# Patient Record
(demographics unavailable — no encounter records)

---

## 2018-12-12 NOTE — MRI
MRI RIGHT HIP WITHOUT CONTRAST: 

12/12/18

 

HISTORY: 

Trochanteric bursitis. Fall. M70.61.

 

COMPARISON:  

None.

 

FINDINGS:  

 

BONES:

There is no acute fracture. No malalignment. No stress edema. Moderate hypertrophic degenerative ham
ge of the pubic symphysis. 

 

TENDONS:

There is high grade partial tear of both the right gluteus medius and minimus tendon at the greater t
rochanteric insertion. There are erosive changes of the lateral cortex of the right greater trochante
r. There is a large volume right sided greater trochanteric bursitis. 

 

INTRAPELVIC SOFT TISSUES:

Unremarkable. 

 

IMPRESSION:  

High grade gluteus minimus and partial grade gluteus medius right sided tendon tears with greater tro
chanteric bursitis. Patient may benefit ultrasound guided steroid injection. There is associated mild
 40-50% atrophy of the right gluteus minimus muscle. 

 

POS: [facility]

## 2019-02-28 NOTE — CT
CT PULMONARY LUNG SCAN:

 

History: 40-plus year smoker. History of stomach cancer. 

 

FINDINGS: 

The lungs are clear of any infiltrative process. No bronchiectatic change or honeycombing noted. With
in the right upper lobe is a slightly more linear oriented opacity seen on sagittal image 62, axial i
mage 18 measuring 4 x 6 mm in size. Also within the right upper lobe in the more anterior segment see
n on axial 26 and coronal 43 is some nodular opacities, almost more of a tree and bud appearance. The
re is a pleural based nodular density which is located in the posterior segment of the right upper lo
be. It abuts the major fissure and may represent an intrafissural node. It is seen axial image 26, sa
gittal image 51. It measures in the 5-6 mm range. Another nodular density is seen on axial image 22, 
it measures 4 mm in size. It is seen on sagittal image 62 and coronal image 77 and it measures larges
t on the sagittal images were it is in the 5-6 mm range. 

 

No significant mediastinal adenopathy appreciated. The visualized liver parenchyma shows no focal fin
dings. Gallbladder has been removed. 

 

IMPRESSION: 

Lung RADS category 2 - benign appearance, low likelihood of becoming clinically active cancer. Annual
 follow up CT is recommended. 

 

POS: [facility]

## 2019-07-01 NOTE — RAD
TWO VIEW CHEST SERIES:

 

INDICATIONS:

Abnormal weight loss.

 

COMPARISON:

Exam from 04/13/2018.

 

FINDINGS:

There is hyperinflation of the lungs.  Mild interstitial prominence and slight reticulonodular nodula
rity is seen at the upper to mid lung zones, more so on the right.  The cardiomediastinal silhouette 
is of normal size.  There is vascular calcification.

 

IMPRESSION:

Mild reticulonodularity of the upper to mid lung zones, more so on the right.  This could relate to a
n atypical infection.  Given nodularity, recommend follow-up two view chest upon completion of treatm
ent regimen and resolution of acute symptoms, to confirm resolution.

 

POS: [facility]

## 2019-08-19 NOTE — RAD
EXAM:

Chest PA and lateral:



HISTORY:

Cough. Follow-up chest radiograph 



COMPARISON:

7/1/2019



FINDINGS:



Heart: Normal cardiac silhouette

Aorta: Stable elongation of the thoracic aorta

Pulmonary vessels: Normal

Costophrenic angles: Costophrenic angles are clear. 

Lungs: Hyperinflation. Chronic changes. No mass. No consolidation.

Pneumothorax: No pneumothorax

Osseous structures: No osseous abnormalities

IMPRESSION:



1. No acute cardiopulmonary process.

2. Stable elongation of the aorta

3. Hyperinflation. Chronic changes







Reported By: Chaparro Yanez 

Electronically Signed:  8/19/2019 3:22 PM

## 2019-09-30 NOTE — OP
DATE OF PROCEDURE:  09/27/2019



PREOPERATIVE DIAGNOSIS:  Celiac artery stenosis with weight loss.



PROCEDURE PERFORMED:  Abdominal aortography with selective cannulation of the celiac

artery and superior mesenteric artery separately.  This was done through a right

femoral approach and then a right brachial artery cutdown and repair with stenting

of the celiac artery. 



ANESTHESIA:  1% lidocaine.



FLUOROSCOPY:  45.2 minutes.



CONTRAST:  60 mL.



DESCRIPTION OF PROCEDURE:  After prepping and draping the right groin,

ultrasound-guided puncture of the right common femoral artery was carried out after

lidocaine administration.  A 5-St Lucian dilator and sheath were placed following which

the PA and lateral angiography was obtained and the celiac artery and superior

mesenteric artery were each cannulated.  There was no stenosis in the superior

mesenteric artery; however, with the 5-St Lucian [place] catheter in the superior

mesenteric artery, there was no reflux of contrast.  At this time, several wires

were used to enter the celiac artery including angled Glidewire, Bentson guidewire,

and attempts with stiffer wires as well as 0.014 Luge wire, but no catheter would

track over this.  At that time, it was felt that the angle off the aorta was too

steep in the celiac artery and the approach would need to be carried out from above.

 Following this, the right arm was then prepped and draped and lidocaine used to

infiltrate above the antecubital crease.  Cutdown of the brachial artery was then

performed and a needle puncture and placement of a 90 cm 5-St Lucian destination sheath

was accomplished.  Using a Bentson wire as well as a Glidewire and various catheters

to negotiate the aortic arch, the 6-St Lucian Destination sheath was eventually passed

down to the level of the celiac artery.  Following this, a 0.014 wire was advanced

and balloon angioplasty with a 4 x 2 balloon was performed.  The wire then came out

of the vessel due to built-up tension in the sheath and it moved.  The wire was then

replaced.  Following which, a 5 x 15 Express stent was deployed with 

good angiographic result.  Following this, the brachial artery was repaired with a

running 6-0 Prolene suture.  The wound was then closed in layers.  The patient's

catheter in the groin was then removed and pressure held, and she was taken to the

recovery room area. 







Job ID:  [id number]

## 2019-10-25 NOTE — MRI
MRI OF THE LUMBAR SPINE WITH AND WITHOUT IV CONTRAST:

10/25/19

 

INDICATION:

75-year-old female with lumbar radiculopathy; right sided low back pain that radiates into the right 
leg for several months. History of lumbar surgery two years ago without recent trauma. 

 

CONTRAST:

8 mL Multihance.

 

COMPARISON: 

MR lumbar spine with and without contrast dated 2/27/18.

 

FINDINGS: 

Since the comparison examination there has been interval placement of right unilateral pedicle screws
 at L4 and L5. 

 

Bone marrow signal intensity appears within normal limits. Mild Modic end plate degenerative change s
een at L5. No acute fracture is demonstrated. 

 

Conus is seen to terminate at approximately L1. 

 

The visualized aspects of the retroperitoneum demonstrates small bilateral renal cysts. 

 

At L5-S1, there is a broad based bulge with a superimposed central protrusion that is stable to the p
rior exam. The disc bulge in addition to facet hypertrophy and loss of disc space height induces mode
rate left and mild right neural foraminal narrowing. This is stable to the prior exam. 

 

At L4-5, there is a disc osteophyte complex with facet hypertrophy but no appreciable central canal n
arrowing. There is suggestion of mild right neural foraminal narrowing which is stable. 

 

At L3-4, there is a broad based disc bulge with facet hypertrophy inducting mild central canal narrow
ing with mild bilateral neural foraminal narrowing which is stable to the prior exam. 

 

At L2-3, there is a disc osteophyte complex and facet joint degenerative change inducing mild left ne
ural foraminal narrowing which is stable to the prior exam. 

 

At L1-2, there is no appreciable central canal or neural foraminal narrowing. 

 

At T12-L1, there is no appreciable central canal or neural foraminal narrowing. 

 

Postcontrast images demonstrate no definite abnormal region of enhancement. 

 

IMPRESSION: 

1.      Interval postsurgical change of a right unilateral interbody fusion at L4-5. 

2.      Stable multilevel spondylosis of the lumbar spine with multilevel neural foraminal narrowing.
 

 

POS: [facility]

## 2020-03-19 NOTE — MRI
Exam:

Right lower extremity MRI without IV contrast:



HISTORY:

Pain right hip following an injury from a fall several weeks ago



COMPARISON:

12/12/2018



FINDINGS:

There is acute abnormal marrow signal involving the lateral aspect of the base of the greater trochan
ter of the right femur evidence for a nondisplaced fracture with minimal adjacent edema. There is

again noted to be mid to high-grade tears of the gluteus minimus and gluteus medius tendon insertions
 as well as extensive fluid within the trochanteric bursa evidence for extensive trochanteric

bursitis. There also appears to be some abnormal high signal within the superior vastus lateralis mus
becca evidence for contusion or muscle strain. There are some fatty changes in the gluteus minimus

muscle, stable. There is evidence for bilateral hip arthrosis. Somewhat indistinct and blunted hip la
eduarda evidence for degenerative fraying.



IMPRESSION:

Evidence for acute nondisplaced subcortical fracture involving the lateral aspect of the base of the 
greater trochanter of the right femur.

Overall stable appearing fairly extensive partial thickness tearing of the gluteus minimus and gluteu
s medius minimus tendon insertion regions with extensive fluid in the trochanteric bursa evidence

for bursitis.

Minimal increased abnormal signal in the upper vastus medialis muscle evidence for strain or contusio
n.



Reported By: Yosef Rajan 

Electronically Signed:  3/19/2020 9:36 AM

## 2020-05-06 NOTE — RAD
EXAM:

XR Ba Swallow W/Speech Therap



PROVIDED CLINICAL HISTORY:

Dysphagia, oral pharyngeal phase. Feeding difficulties. Progressive dysphagia with coughing and gaggi
ng with food intake. Gastroesophageal reflux disease without esophagitis.



COMPARISON:

None



FINDINGS:

This examination was performed by speech pathology, and a video is available for evaluation. Varying 
consistencies of barium were administered during the exam. There is normal formation of bolus into

the posterior pharynx. A few episodes of premature spill of contrast into the vallecula were noted pr
ior to initiation of swallowing mechanism. Episodes of mild penetration were seen with swallowing

of thin liquid barium. No aspiration is noted during the exam. Small linear filling defect is seen in
 the anterior aspect of the esophagus at the C5-6 level which may represent small web within the

esophagus. A 12.5 mm barium tablet was administered during the exam which does traverse this region f
reely and without holdup.



Postoperative changes of the cervical spine are seen with anterior plate and screws transfixing the C
4-C5 and C5-6 levels with evidence of intradiscal prostheses. Mild degenerative changes are seen at

the C6-7 level.



A 12.5 mm barium tablet was administered during the exam



Fluoroscopy:

Time-21.9 seconds

Dose-12.12 mGy



IMPRESSION:



1. 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 A 12.5 mm barium tablet was administered 
during this exam which traverses this region freely and without holdup.

2. Mild penetration without evidence of aspiration. Indications



Reported By: Yaya Jenkins 

Electronically Signed:  5/6/2020 11:10 AM